# Patient Record
Sex: FEMALE | Race: BLACK OR AFRICAN AMERICAN | NOT HISPANIC OR LATINO | Employment: FULL TIME | URBAN - METROPOLITAN AREA
[De-identification: names, ages, dates, MRNs, and addresses within clinical notes are randomized per-mention and may not be internally consistent; named-entity substitution may affect disease eponyms.]

---

## 2019-12-05 ENCOUNTER — OFFICE VISIT (OUTPATIENT)
Dept: OPHTHALMOLOGY | Facility: CLINIC | Age: 59
End: 2019-12-05
Payer: COMMERCIAL

## 2019-12-05 DIAGNOSIS — H40.003 GLAUCOMA SUSPECT OF BOTH EYES: ICD-10-CM

## 2019-12-05 DIAGNOSIS — H11.043 PERIPHERAL PTERYGIUM, STATIONARY, BILATERAL: ICD-10-CM

## 2019-12-05 DIAGNOSIS — H52.4 PRESBYOPIA OF BOTH EYES: Primary | ICD-10-CM

## 2019-12-05 DIAGNOSIS — H25.093 AGE-RELATED INCIPIENT CATARACT OF BOTH EYES: ICD-10-CM

## 2019-12-05 ASSESSMENT — CONF VISUAL FIELD
OS_NORMAL: 1
OD_NORMAL: 1

## 2019-12-05 ASSESSMENT — REFRACTION_MANIFEST
OS_AXIS: 180
OS_CYLINDER: +0.50
OD_AXIS: 090
OD_CYLINDER: +0.75
OS_CYLINDER: +0.75
OD_AXIS: 150
OS_AXIS: 008
OS_SPHERE: +1.00
OD_SPHERE: +1.25
OD_SPHERE: +0.50
OD_CYLINDER: +0.75
OD_ADD: +2.50
OS_SPHERE: +1.50
OS_ADD: +2.50

## 2019-12-05 ASSESSMENT — VISUAL ACUITY
OD_SC: 20/60
METHOD_MR_RETINOSCOPY: 1
METHOD: SNELLEN - LINEAR
OD_SC+: -2
OS_SC: 20/80
OS_SC+: -1

## 2019-12-05 ASSESSMENT — SLIT LAMP EXAM - LIDS
COMMENTS: NORMAL
COMMENTS: NORMAL

## 2019-12-05 ASSESSMENT — REFRACTION_WEARINGRX
OD_SPHERE: +2.50
SPECS_TYPE: OTC READER
OD_CYLINDER: SPHERE
OS_CYLINDER: SPHERE
OS_SPHERE: +2.50

## 2019-12-05 ASSESSMENT — EXTERNAL EXAM - LEFT EYE: OS_EXAM: NORMAL

## 2019-12-05 ASSESSMENT — CUP TO DISC RATIO
OD_RATIO: 0.6
OS_RATIO: 0.4

## 2019-12-05 ASSESSMENT — TONOMETRY
IOP_METHOD: ICARE
OD_IOP_MMHG: 12
OS_IOP_MMHG: 13

## 2019-12-05 ASSESSMENT — EXTERNAL EXAM - RIGHT EYE: OD_EXAM: NORMAL

## 2019-12-05 NOTE — PROGRESS NOTES
"History  HPI     Blurred Vision Evaluation     In both eyes.  Charactertized as  blurred vision.  Context:  distance vision and near vision.  Associated symptoms include Negative for dryness, tearing, eye pain, flashes and floaters.  Treatments tried include no treatments.              Comments     Patient notes that her VA is blurry, lost gls, went to pharmacy and got OTC gls that don't work, when not wearing correction \"I feel pressure in both eyes\"     Charisma Rodgers COT December 5, 2019 8:55 AM            Last edited by Liana Rodgers on 12/5/2019  8:56 AM. (History)          Assessment/Plan  (H52.4) Presbyopia of both eyes  (primary encounter diagnosis)  Comment: Presbyopia both eyes   Plan: REFRACTION         Educated patient on condition and clinical findings. Dispensed spectacle prescription for full time wear. Educated patient on possibility of adaptation period, if symptoms do not improve return to clinic for further testing.    (H11.043) Peripheral pterygium, stationary, bilateral  Comment: Asymptomatic, no active inflammation  Plan:  No treatment indicated at this time. Monitor annually.    (H25.093) Age-related incipient cataract of both eyes  Comment: Not visually significant  Plan:  No treatment indicated at this time. Monitor annually.    (H40.003) Glaucoma suspect of both eyes  Comment: Secondary to asymmetric cupping, RNFL thickness WNL both eyes, likely physiological  Plan: OCT Optic Nerve RNFL Spectralis OU (both eyes)         No treatment indicated at this time. Monitor annually.    Return to clinic in 1 year for comprehensive eye exam.    Complete documentation of historical and exam elements from today's encounter can  be found in the full encounter summary report (not reduplicated in this progress  note). I personally obtained the chief complaint(s) and history of present illness. I  confirmed and edited as necessary the review of systems, past medical/surgical  history, family history, social " history, and examination findings as documented by  others; and I examined the patient myself. I personally reviewed the relevant tests,  images, and reports as documented above. I formulated and edited as necessary the  assessment and plan and discussed the findings and management plan with the  patient and family.    Joseph Armendariz OD, FAAO

## 2019-12-20 ENCOUNTER — TELEPHONE (OUTPATIENT)
Dept: OPHTHALMOLOGY | Facility: CLINIC | Age: 59
End: 2019-12-20

## 2019-12-20 NOTE — TELEPHONE ENCOUNTER
Rx mailed per request 12-20-19  Bob Willingham RN 11:22 AM 12/20/19      [facility]    Phone Message    May a detailed message be left on voicemail: yes    Reason for Call: Form or Letter   Type or form/letter needing completion: rx for glasses // patient states it was not given to her at the appointment on 12/5  Provider: JANICE Armendariz   Date form needed: ASAP   Once completed: Mail form to Name: Hiram Lisa, at Address: [address] [place] [id number]      Action Taken: Message routed to:  [facility]

## 2020-03-13 ENCOUNTER — TELEPHONE (OUTPATIENT)
Dept: OPHTHALMOLOGY | Facility: CLINIC | Age: 60
End: 2020-03-13

## 2020-03-13 NOTE — TELEPHONE ENCOUNTER
[facility] Call [facility]    Phone Message    May a detailed message be left on voicemail: yes     Reason for Call: Requesting Results   Name/type of test: eye exam  Date of test:12/05/20  Was test done at a location other than [facility] (Please fill in the location if not [facility])?: No      Action Taken: Message routed to:  [facility] eye     Travel Screening: Not Applicable

## 2020-03-16 NOTE — TELEPHONE ENCOUNTER
The patient called requesting her prescription be mailed again.  Spectacle prescription mailed to address on file (address verified).

## 2020-05-01 ENCOUNTER — TELEPHONE (OUTPATIENT)
Dept: OPHTHALMOLOGY | Facility: CLINIC | Age: 60
End: 2020-05-01

## 2020-05-01 NOTE — TELEPHONE ENCOUNTER
Note to  to assist in printing last glasses Rx and mailing to address below per request  Bob Willingham RN 2:29 PM 05/01/20        [facility]    Phone Message    May a detailed message be left on voicemail: yes     Reason for Call: Other: Patient is requesting to get a Rx lens copy to new address  [address] [facility] [address]  [place] [address]  Address is updated in patients chart.     Please call once request is complete    Action Taken: Message routed to:  Other: [facility]    Travel Screening: Not Applicable

## 2020-05-04 ENCOUNTER — TELEPHONE (OUTPATIENT)
Dept: OPHTHALMOLOGY | Facility: CLINIC | Age: 60
End: 2020-05-04

## 2022-08-04 ENCOUNTER — MEDICAL CORRESPONDENCE (OUTPATIENT)
Dept: HEALTH INFORMATION MANAGEMENT | Facility: CLINIC | Age: 62
End: 2022-08-04

## 2022-08-05 ENCOUNTER — TRANSCRIBE ORDERS (OUTPATIENT)
Dept: OTHER | Age: 62
End: 2022-08-05

## 2022-08-05 DIAGNOSIS — R76.8 HEPATITIS B SURFACE ANTIGEN POSITIVE: Primary | ICD-10-CM

## 2022-08-08 NOTE — TELEPHONE ENCOUNTER
DIAGNOSIS: bilat leg pain/ [employer] Eboni Armstrong Amina PA.   APPOINTMENT DATE: 8.17.22   NOTES STATUS DETAILS   OFFICE NOTE from referring provider Internal 8.3.22 GAMAL Sauceda   MEDICATION LIST Internal

## 2022-08-11 ENCOUNTER — TRANSCRIBE ORDERS (OUTPATIENT)
Dept: OTHER | Age: 62
End: 2022-08-11

## 2022-08-11 DIAGNOSIS — M54.16 LUMBAR BACK PAIN WITH RADICULOPATHY AFFECTING LOWER EXTREMITY: ICD-10-CM

## 2022-08-11 DIAGNOSIS — M79.604 PAIN IN BOTH LOWER EXTREMITIES: Primary | ICD-10-CM

## 2022-08-11 DIAGNOSIS — M79.605 PAIN IN BOTH LOWER EXTREMITIES: Primary | ICD-10-CM

## 2022-08-17 ENCOUNTER — PRE VISIT (OUTPATIENT)
Dept: ORTHOPEDICS | Facility: CLINIC | Age: 62
End: 2022-08-17

## 2022-08-24 ENCOUNTER — OFFICE VISIT (OUTPATIENT)
Dept: NEUROSURGERY | Facility: CLINIC | Age: 62
End: 2022-08-24
Payer: COMMERCIAL

## 2022-08-24 VITALS — OXYGEN SATURATION: 99 % | HEART RATE: 67 BPM | DIASTOLIC BLOOD PRESSURE: 78 MMHG | SYSTOLIC BLOOD PRESSURE: 128 MMHG

## 2022-08-24 DIAGNOSIS — M54.16 LUMBAR BACK PAIN WITH RADICULOPATHY AFFECTING LOWER EXTREMITY: ICD-10-CM

## 2022-08-24 DIAGNOSIS — M54.41 ACUTE BILATERAL LOW BACK PAIN WITH BILATERAL SCIATICA: Primary | ICD-10-CM

## 2022-08-24 DIAGNOSIS — M79.604 PAIN IN BOTH LOWER EXTREMITIES: ICD-10-CM

## 2022-08-24 DIAGNOSIS — M54.42 ACUTE BILATERAL LOW BACK PAIN WITH BILATERAL SCIATICA: Primary | ICD-10-CM

## 2022-08-24 DIAGNOSIS — M79.605 PAIN IN BOTH LOWER EXTREMITIES: ICD-10-CM

## 2022-08-24 PROCEDURE — 99203 OFFICE O/P NEW LOW 30 MIN: CPT | Performed by: NURSE PRACTITIONER

## 2022-08-24 ASSESSMENT — PAIN SCALES - GENERAL: PAINLEVEL: EXTREME PAIN (8)

## 2022-08-24 NOTE — LETTER
[facility]  Neurosurgery Clinic  [address] [place] [address]  [id number]         August 24, 2022    To Whom it May Concern,      Hiram Hall is being seen at our clinic today. Due to medical reasons, she would benefit from a ground level apartment. Please call our clinic with questions or concerns.       Sincerely,            Diana Garcia CNP  [facility]  Neurosurgery Clinic  [address] [place] [address]  [id number]  [phone]

## 2022-08-24 NOTE — PROGRESS NOTES
[facility] Neurosurgery  Neurosurgery Clinic Visit      CC: back and leg pain    Primary care Provider: Charisse Mathias    Reason For Visit:   I was asked by self to consult on the patient for low back and leg pain.    HPI: Hiram Hall is a 62 year old female with a 3 month history of bilateral low back and bilateral leg pain. She denies injury at the onset. She reports bilatearal low back pain that causes non-specific leg pain. She denies any paresthesias or overt weakness. She has seen [facility] for this a few weeks ago. At that time she was recommended to undergo medication management and PT. She states the PT is not in a location that is convenient for her and would be open to a referral closer to home. She denies any recent imaging or other treatments. She inquires about a letter to request a ground level apartment due to difficulty walking up stairs.     No past medical history on file.    Past Medical History reviewed with patient during visit.    No past surgical history on file.  Past Surgical History reviewed with patient during visit.    No current outpatient medications on file.     No current facility-administered medications for this visit.       No Known Allergies    Social History     Socioeconomic History     Marital status:      Spouse name: None     Number of children: None     Years of education: None     Highest education level: None   Tobacco Use     Smoking status: Never Smoker     Smokeless tobacco: Never Used       Family History   Problem Relation Age of Onset     Glaucoma No family hx of      Macular Degeneration No family hx of          ROS: 10 point ROS neg other than the symptoms noted above in the HPI.    Vital Signs:   /78   Pulse 67   SpO2 99%       Examination:  Constitutional:  Alert, well nourished, NAD.  Memory: recent and remote memory   HEENT: Normocephalic, atraumatic.   Pulm:  Without shortness of breath   CV:  No pitting edema of BLE.       Neurological:  Awake  Alert  Oriented x 3  Speech clear    Motor exam:   Hip Flexion:                 Right: 5/5  Left:  5/5  Hip Abduction:             Right:  5/5  Left:  5/5  Hip Adduction:             Right:  5/5  Left:  5/5  Plantar Flexion:           Right:  5/5  Left:  5/5  Dorsal Flexion:            Right:  5/5  Left:  5/5  EHL:                            Right:  5/5  Left:  5/5     Sensation normal to bilateral upper and lower extremities  Muscle tone to bilateral upper and lower extremities   Gait: Able to stand from a seated position. Normal non-antalgic, non-myelopathic gait.  Able to heel/toe walk without loss of balance    Lumbar examination reveals no tenderness of the spine or paraspinous muscles.  Hip height is symmetrical. Negative SI joint, sciatic notch or greater trochanteric tenderness to palpation bilaterally.  Straight leg raise is negative bilaterally.      Imaging:   Nothing to review    Assessment/Plan:   Acute bilateral low back pain with bilateral sciatica.     Will obtain lumbar XR for review. I will contact her if the results are abnormal. Referral placed for PT and provided generic copy to her to bring to her PT of choice. Letter written recommending ground level apartment per her request. She will contact our office if symptoms persist or worsen. She verbalized understanding and agreement.    Patient Instructions   -Lumbar xray today. I will contact you if the results are abnormal.  -Physical therapy referral placed and paper copy provided.  -Letter for ground level apartment written and provided.  -Contact our clinic if symptoms persist or worsen.  -Please contact our clinic with questions or concerns at [phone].      Diana Garcia, [address] [address]  Tel [phone]  Fax [phone]

## 2022-08-24 NOTE — LETTER
8/24/2022         RE: Hiram Hall  [address]   [facility] [address]        Dear Colleague,    Thank you for referring your patient, Hiram Hall, to the [facility]. Please see a copy of my visit note below.    [facility] Neurosurgery  Neurosurgery Clinic Visit      CC: back and leg pain    Primary care Provider: Charisse Mathias    Reason For Visit:   I was asked by self to consult on the patient for low back and leg pain.    HPI: Hiram Hall is a 62 year old female with a 3 month history of bilateral low back and bilateral leg pain. She denies injury at the onset. She reports bilatearal low back pain that causes non-specific leg pain. She denies any paresthesias or overt weakness. She has seen [facility] for this a few weeks ago. At that time she was recommended to undergo medication management and PT. She states the PT is not in a location that is convenient for her and would be open to a referral closer to home. She denies any recent imaging or other treatments. She inquires about a letter to request a ground level apartment due to difficulty walking up stairs.     No past medical history on file.    Past Medical History reviewed with patient during visit.    No past surgical history on file.  Past Surgical History reviewed with patient during visit.    No current outpatient medications on file.     No current facility-administered medications for this visit.       No Known Allergies    Social History     Socioeconomic History     Marital status:      Spouse name: None     Number of children: None     Years of education: None     Highest education level: None   Tobacco Use     Smoking status: Never Smoker     Smokeless tobacco: Never Used       Family History   Problem Relation Age of Onset     Glaucoma No family hx of      Macular Degeneration No family hx of          ROS: 10 point ROS neg other than the symptoms noted above in  the HPI.    Vital Signs:   /78   Pulse 67   SpO2 99%       Examination:  Constitutional:  Alert, well nourished, NAD.  Memory: recent and remote memory   HEENT: Normocephalic, atraumatic.   Pulm:  Without shortness of breath   CV:  No pitting edema of BLE.      Neurological:  Awake  Alert  Oriented x 3  Speech clear    Motor exam:   Hip Flexion:                 Right: 5/5  Left:  5/5  Hip Abduction:             Right:  5/5  Left:  5/5  Hip Adduction:             Right:  5/5  Left:  5/5  Plantar Flexion:           Right:  5/5  Left:  5/5  Dorsal Flexion:            Right:  5/5  Left:  5/5  EHL:                            Right:  5/5  Left:  5/5     Sensation normal to bilateral upper and lower extremities  Muscle tone to bilateral upper and lower extremities   Gait: Able to stand from a seated position. Normal non-antalgic, non-myelopathic gait.  Able to heel/toe walk without loss of balance    Lumbar examination reveals no tenderness of the spine or paraspinous muscles.  Hip height is symmetrical. Negative SI joint, sciatic notch or greater trochanteric tenderness to palpation bilaterally.  Straight leg raise is negative bilaterally.      Imaging:   Nothing to review    Assessment/Plan:   Acute bilateral low back pain with bilateral sciatica.     Will obtain lumbar XR for review. I will contact her if the results are abnormal. Referral placed for PT and provided generic copy to her to bring to her PT of choice. Letter written recommending ground level apartment per her request. She will contact our office if symptoms persist or worsen. She verbalized understanding and agreement.    Patient Instructions   -Lumbar xray today. I will contact you if the results are abnormal.  -Physical therapy referral placed and paper copy provided.  -Letter for ground level apartment written and provided.  -Contact our clinic if symptoms persist or worsen.  -Please contact our clinic with questions or concerns at  [phone].      Diana Garcia, ERIK  [facility]  [address] [address]  Tel [phone]  Fax [phone]        Again, thank you for allowing me to participate in the care of your patient.        Sincerely,        Diana Garcia, NP

## 2022-08-24 NOTE — NURSING NOTE
Hiram Hall is a 62 year old female who presents for:  Chief Complaint   Patient presents with     Consult     Low back pain, radiating to both lower extremities        Initial Vitals:  /78   Pulse 67   SpO2 99%  There is no height or weight on file to calculate BMI.. There is no height or weight on file to calculate BSA. BP completed using cuff size: large  Extreme Pain (8)    Nursing Comments:     Kareem Renteria MA

## 2022-08-24 NOTE — PATIENT INSTRUCTIONS
-Lumbar xray today. I will contact you if the results are abnormal.  -Physical therapy referral placed and paper copy provided.  -Letter for ground level apartment written and provided.  -Contact our clinic if symptoms persist or worsen.  -Please contact our clinic with questions or concerns at [phone].

## 2022-08-26 NOTE — TELEPHONE ENCOUNTER
RECORDS RECEIVED FROM: [facility]   Appt Date: 09.15.2022   NOTES STATUS DETAILS   OFFICE NOTE from referring provider     OFFICE NOTES from other specialists [facility] 08.03.2022 Namrata Lyn PA-C, MPAS  [facility]   DISCHARGE SUMMARY from hospital     MEDICATION LIST Care Everywhere    LIVER BIOSPY (IF APPLICABLE)      PATHOLOGY REPORTS      IMAGING     ENDOSCOPY (IF AVAILABLE)     COLONOSCOPY (IF AVAILABLE)     ULTRASOUND LIVER     CT OF ABDOMEN     MRI OF LIVER     FIBROSCAN, US ELASTOGRAPHY, FIBROSIS SCAN, MR ELASTOGRAPHY     LABS     HEPATIC PANEL (LIVER PANEL)     BASIC METABOLIC PANEL     COMPLETE METABOLIC PANEL [facility] 07.27.2022   COMPLETE BLOOD COUNT (CBC) [facility] 07.27.2022   INTERNATIONAL NORMALIZED RATIO (INR)     HEPATITIS C ANTIBODY [facility] 11.26.2019   HEPATITIS C VIRAL LOAD/PCR     HEPATITIS C GENOTYPE     HEPATITIS B SURFACE ANTIGEN [facility] 07.27.2022   HEPATITIS B SURFACE ANTIBODY [facility] Every[facility] 07.27.2022   HEPATITIS B DNA QUANT LEVEL     HEPATITIS B CORE ANTIBODY [facility] 11.26.2019

## 2022-09-01 ENCOUNTER — DOCUMENTATION ONLY (OUTPATIENT)
Dept: NEUROSURGERY | Facility: CLINIC | Age: 62
End: 2022-09-01

## 2022-09-06 DIAGNOSIS — R79.89 ABNORMAL LFTS: Primary | ICD-10-CM

## 2022-09-08 NOTE — PROGRESS NOTES
Form was completed and signed by provider. Form was faxed to [facility].  Fax [phone]  Right fax confirmed sent  Form is scanned into media

## 2022-09-13 DIAGNOSIS — R79.89 ABNORMAL LFTS: Primary | ICD-10-CM

## 2022-09-15 ENCOUNTER — PRE VISIT (OUTPATIENT)
Dept: GASTROENTEROLOGY | Facility: CLINIC | Age: 62
End: 2022-09-15

## 2022-10-17 ENCOUNTER — DOCUMENTATION ONLY (OUTPATIENT)
Dept: NEUROSURGERY | Facility: CLINIC | Age: 62
End: 2022-10-17

## 2022-10-25 NOTE — PROGRESS NOTES
Form already completed and faxed. See 9/1 encounter.     Faxed form to [facility]  again on October 25, 2022 to fax number [phone]    Right Fax confirmed at 204PM    Balwinder Kramer RN

## 2023-08-24 ENCOUNTER — TRANSCRIBE ORDERS (OUTPATIENT)
Dept: OTHER | Age: 63
End: 2023-08-24

## 2023-08-24 DIAGNOSIS — R76.8 HEPATITIS B SURFACE ANTIGEN POSITIVE: Primary | ICD-10-CM

## 2023-08-28 ENCOUNTER — ANCILLARY PROCEDURE (OUTPATIENT)
Dept: MAMMOGRAPHY | Facility: CLINIC | Age: 63
End: 2023-08-28
Attending: NURSE PRACTITIONER
Payer: COMMERCIAL

## 2023-08-28 DIAGNOSIS — Z12.31 ENCOUNTER FOR SCREENING MAMMOGRAM FOR MALIGNANT NEOPLASM OF BREAST: ICD-10-CM

## 2023-08-28 PROCEDURE — 77067 SCR MAMMO BI INCL CAD: CPT

## 2023-08-28 PROCEDURE — 77067 SCR MAMMO BI INCL CAD: CPT | Mod: 26 | Performed by: RADIOLOGY

## 2023-09-28 ENCOUNTER — TRANSCRIBE ORDERS (OUTPATIENT)
Dept: OTHER | Age: 63
End: 2023-09-28

## 2023-09-28 DIAGNOSIS — M54.41 CHRONIC BILATERAL LOW BACK PAIN WITH BILATERAL SCIATICA: Primary | ICD-10-CM

## 2023-09-28 DIAGNOSIS — G89.29 CHRONIC BILATERAL LOW BACK PAIN WITH BILATERAL SCIATICA: Primary | ICD-10-CM

## 2023-09-28 DIAGNOSIS — M54.42 CHRONIC BILATERAL LOW BACK PAIN WITH BILATERAL SCIATICA: Primary | ICD-10-CM

## 2023-11-27 ENCOUNTER — THERAPY VISIT (OUTPATIENT)
Dept: PHYSICAL THERAPY | Facility: CLINIC | Age: 63
End: 2023-11-27
Attending: NURSE PRACTITIONER
Payer: COMMERCIAL

## 2023-11-27 DIAGNOSIS — M54.41 CHRONIC BILATERAL LOW BACK PAIN WITH BILATERAL SCIATICA: ICD-10-CM

## 2023-11-27 DIAGNOSIS — G89.29 CHRONIC BILATERAL LOW BACK PAIN WITH BILATERAL SCIATICA: ICD-10-CM

## 2023-11-27 DIAGNOSIS — M54.42 CHRONIC BILATERAL LOW BACK PAIN WITH BILATERAL SCIATICA: ICD-10-CM

## 2023-11-27 PROCEDURE — 97161 PT EVAL LOW COMPLEX 20 MIN: CPT | Mod: GP

## 2023-11-27 PROCEDURE — 97110 THERAPEUTIC EXERCISES: CPT | Mod: GP

## 2023-11-27 NOTE — PROGRESS NOTES
PHYSICAL THERAPY EVALUATION  Type of Visit: Evaluation    See electronic medical record for Abuse and Falls Screening details.    Subjective       Presenting condition or subjective complaint: Pt reports burning back pain that radiates to B feet. It is worst on the L side. Pain with standing durations longer than an hour. Her L sided pain is constant. This has been present since 2020. There was no trauma or injury that caused her pain to start.     Date of onset:  01/01/20    Relevant medical history:   Past Medical History:   Diagnosis Date    Hepatitis B carrier (H)     Vitamin D deficiency           Dates & types of surgery: No past surgical history on file.       Prior diagnostic imaging/testing results: X-ray 2022, There is degenerative grade 1  anterolisthesis of L5 upon S1. Alignment otherwise normal. Vertebral  body heights normal. No fractures. Loss of disc space height and  degenerative endplate spurring at L5-S1. Facet arthropathy at L5-S1.       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       Hobbies/Interests: self care      Patient goals for therapy:      Pain assessment: See objective evaluation for additional pain details     Objective   LUMBAR SPINE EVALUATION  PAIN: Pain is Exacerbated By: long duration standing and sitting, work duties, lumbar extension  GAIT: lateral trunk sway B, R>L  BALANCE/PROPRIOCEPTION:  tandem stance WNL B, SL stance x 10 sec R, 5 sec L and unsteady  ROM: AROM WNL, LBP with extension, did not worsen radiating pain  PELVIC/SI SCREEN:  not assessed today due to time  STRENGTH: WNL  MYOTOMES: WNL  DERMATOMES: WNL  NEURAL TENSION:  positive slump  and SLR on L for radiating pain, numbness in L foot with slump  FLEXIBILITY:  decreased HS length on L  FUNCTIONAL TESTS:  STS- WNL, no use of UE, reports decrease in pain with repeated STS  PALPATION: WNL      Assessment & Plan   CLINICAL IMPRESSIONS  Medical Diagnosis: B lumbar radiculopathy    Treatment Diagnosis: B lumbar radiculopathy   Impression/Assessment: Patient is a 63 year old female with LBP and radiculopathy present in the LLE complaints.  The following significant findings have been identified: Pain, Impaired gait, Impaired muscle performance, and Decreased activity tolerance. These impairments interfere with their ability to perform work tasks, household chores, household mobility, and community mobility as compared to previous level of function.     Clinical Decision Making (Complexity):  Clinical Presentation: Stable/Uncomplicated  Clinical Presentation Rationale: based on medical and personal factors listed in PT evaluation  Clinical Decision Making (Complexity): Low complexity    PLAN OF CARE  Treatment Interventions:  Interventions: Manual Therapy, Neuromuscular Re-education, Therapeutic Activity, Therapeutic Exercise    Long Term Goals     PT Goal 1  Goal Identifier: LTG  Goal Description: Pt will be able to stand for 1 hour and perform all work duties with 2/10 LBP and no radiating leg pain.  Target Date: 02/19/24      Frequency of Treatment: once every 4-6 weeks  Duration of Treatment: 12 weeks            Risks and benefits of evaluation/treatment have been explained.   Patient/Family/caregiver agrees with Plan of Care.     Evaluation Time:     PT Eval, Low Complexity Minutes (62345): 38     Signing Clinician: Dontrell Mills PT      [facility]                                                                                   OUTPATIENT PHYSICAL THERAPY      PLAN OF TREATMENT FOR OUTPATIENT REHABILITATION   Patient's Last Name, First Name, Hiram Perez Date  of Birth:  1960   Provider's Name   [facility]   Medical Record No.  [id number]     Onset Date: 01/01/20  Start of Care Date: 11/27/23     Medical Diagnosis:  B lumbar radiculopathy      PT Treatment Diagnosis:  B lumbar radiculopathy Plan of Treatment  Frequency/Duration: once every 4-6 weeks/ 12 weeks    Certification date from 11/27/23 to 02/19/24         See note for plan of treatment details and functional goals     Dontrell Mills, PT                         I CERTIFY THE NEED FOR THESE SERVICES FURNISHED UNDER        THIS PLAN OF TREATMENT AND WHILE UNDER MY CARE     (Physician attestation of this document indicates review and certification of the therapy plan).              Referring Provider:  Charisse Mathias    Initial Assessment  See Epic Evaluation- Start of Care Date: 11/27/23